# Patient Record
Sex: FEMALE | URBAN - NONMETROPOLITAN AREA
[De-identification: names, ages, dates, MRNs, and addresses within clinical notes are randomized per-mention and may not be internally consistent; named-entity substitution may affect disease eponyms.]

---

## 2018-08-16 NOTE — PATIENT DISCUSSION
feels temp VF OD is blurred or missing.  I cannot see it on CVFs today.  get formal HVF and check color plates.  recent anemia with sig blood loss (needed a transfusion).  ed could have had tissue damage at level of Kamari Garzon or even brain during this time.

## 2018-08-31 PROBLEM — H52.4: Noted: 2018-08-31

## 2018-09-04 NOTE — PATIENT DISCUSSION
feels temp VF OD is blurred or missing.  I could not see it on CVF last visit so we did a  formal HVF and check color plates today.  This is confirmed on formal VF today and I suspect it may be related to his  recent anemia with sig blood loss (needed a transfusion).  ed could have had tissue damage at level of Kamari Angelo Garzon or even brain during this time which may have produced an ischemic optic neuropathy.  VF does not seems to correspond binocularly very well but is some suggestion of temporal loss as well OS.  could not rule out a bitemporal defect.  I recommend we consult Dr Trent Lucio at [facility] for further evaluation.

## 2020-11-13 ENCOUNTER — IMPORTED ENCOUNTER (OUTPATIENT)
Dept: URBAN - NONMETROPOLITAN AREA CLINIC 1 | Facility: CLINIC | Age: 50
End: 2020-11-13

## 2020-11-13 PROCEDURE — S0621 ROUTINE OPHTHALMOLOGICAL EXA: HCPCS

## 2022-04-17 ASSESSMENT — VISUAL ACUITY
OD_CC: 20/20
OS_CC: 20/20

## 2022-04-17 ASSESSMENT — TONOMETRY
OS_IOP_MMHG: 16
OD_IOP_MMHG: 16